# Patient Record
Sex: FEMALE | Race: OTHER | Employment: FULL TIME | URBAN - METROPOLITAN AREA
[De-identification: names, ages, dates, MRNs, and addresses within clinical notes are randomized per-mention and may not be internally consistent; named-entity substitution may affect disease eponyms.]

---

## 2022-12-13 ENCOUNTER — HOSPITAL ENCOUNTER (EMERGENCY)
Facility: HOSPITAL | Age: 27
Discharge: HOME | End: 2022-12-13
Attending: EMERGENCY MEDICINE

## 2022-12-13 VITALS
DIASTOLIC BLOOD PRESSURE: 74 MMHG | TEMPERATURE: 98 F | SYSTOLIC BLOOD PRESSURE: 118 MMHG | OXYGEN SATURATION: 96 % | HEART RATE: 108 BPM | RESPIRATION RATE: 18 BRPM

## 2022-12-13 DIAGNOSIS — T78.40XA ALLERGIC REACTION, INITIAL ENCOUNTER: Primary | ICD-10-CM

## 2022-12-13 PROCEDURE — 99283 EMERGENCY DEPT VISIT LOW MDM: CPT

## 2022-12-13 RX ORDER — PREDNISONE 20 MG/1
40 TABLET ORAL DAILY
Qty: 10 TABLET | Refills: 0 | Status: SHIPPED | OUTPATIENT
Start: 2022-12-13 | End: 2022-12-18

## 2022-12-13 RX ORDER — DIPHENHYDRAMINE HCL 25 MG
25 CAPSULE ORAL ONCE
Status: COMPLETED | OUTPATIENT
Start: 2022-12-13 | End: 2022-12-13

## 2022-12-13 NOTE — ED INITIAL ASSESSMENT (HPI)
Pt to ED for an allergic reaction for the past 5 weeks. Pt currently has hives on torso and arms, and has lip swelling. Pt denies her throat closing up, pt has a normal sized tongue. Pt has seen doctors for this, is on medication, but is unsure what the reaction is to.

## 2023-02-06 ENCOUNTER — NURSE ONLY (OUTPATIENT)
Dept: ALLERGY | Facility: CLINIC | Age: 28
End: 2023-02-06

## 2023-02-06 ENCOUNTER — OFFICE VISIT (OUTPATIENT)
Dept: ALLERGY | Facility: CLINIC | Age: 28
End: 2023-02-06

## 2023-02-06 ENCOUNTER — LAB ENCOUNTER (OUTPATIENT)
Dept: LAB | Facility: HOSPITAL | Age: 28
End: 2023-02-06
Attending: ALLERGY & IMMUNOLOGY
Payer: COMMERCIAL

## 2023-02-06 VITALS
DIASTOLIC BLOOD PRESSURE: 76 MMHG | TEMPERATURE: 99 F | HEART RATE: 92 BPM | RESPIRATION RATE: 20 BRPM | OXYGEN SATURATION: 100 % | SYSTOLIC BLOOD PRESSURE: 109 MMHG

## 2023-02-06 VITALS
WEIGHT: 189.38 LBS | HEIGHT: 63 IN | DIASTOLIC BLOOD PRESSURE: 74 MMHG | SYSTOLIC BLOOD PRESSURE: 108 MMHG | HEART RATE: 97 BPM | OXYGEN SATURATION: 99 % | BODY MASS INDEX: 33.55 KG/M2

## 2023-02-06 DIAGNOSIS — Z91.018 FOOD ALLERGY: ICD-10-CM

## 2023-02-06 DIAGNOSIS — L50.1 CHRONIC IDIOPATHIC URTICARIA: Primary | ICD-10-CM

## 2023-02-06 DIAGNOSIS — Z91.09 ENVIRONMENTAL ALLERGIES: ICD-10-CM

## 2023-02-06 DIAGNOSIS — Z3A.01 LESS THAN 8 WEEKS GESTATION OF PREGNANCY: ICD-10-CM

## 2023-02-06 DIAGNOSIS — Z92.29 COVID-19 VACCINE SERIES COMPLETED: ICD-10-CM

## 2023-02-06 DIAGNOSIS — L50.1 CHRONIC IDIOPATHIC URTICARIA: ICD-10-CM

## 2023-02-06 DIAGNOSIS — Z23 FLU VACCINE NEED: ICD-10-CM

## 2023-02-06 PROCEDURE — 86003 ALLG SPEC IGE CRUDE XTRC EA: CPT | Performed by: ALLERGY & IMMUNOLOGY

## 2023-02-06 PROCEDURE — 36415 COLL VENOUS BLD VENIPUNCTURE: CPT | Performed by: ALLERGY & IMMUNOLOGY

## 2023-02-06 PROCEDURE — 82785 ASSAY OF IGE: CPT | Performed by: ALLERGY & IMMUNOLOGY

## 2023-02-06 RX ORDER — CETIRIZINE HYDROCHLORIDE 10 MG/1
20 TABLET ORAL DAILY
COMMUNITY
Start: 2023-01-03 | End: 2023-02-06

## 2023-02-06 RX ORDER — PRENATAL VIT/IRON FUM/FOLIC AC 27MG-0.8MG
1 TABLET ORAL DAILY
COMMUNITY
Start: 2023-01-30

## 2023-02-06 RX ORDER — DIPHENHYDRAMINE HYDROCHLORIDE 12.5 MG/5ML
25 SOLUTION ORAL ONCE
Status: COMPLETED | OUTPATIENT
Start: 2023-02-06 | End: 2023-02-06

## 2023-02-06 RX ORDER — CEPHALEXIN 500 MG/1
500 CAPSULE ORAL 2 TIMES DAILY
COMMUNITY
Start: 2023-02-02

## 2023-02-06 RX ORDER — FAMOTIDINE 20 MG/1
20 TABLET, FILM COATED ORAL DAILY
COMMUNITY
Start: 2022-10-26

## 2023-02-06 RX ORDER — MELATONIN
COMMUNITY
Start: 2022-10-20

## 2023-02-06 RX ORDER — LEVOCETIRIZINE DIHYDROCHLORIDE 5 MG/1
5 TABLET, FILM COATED ORAL 2 TIMES DAILY
Qty: 180 TABLET | Refills: 1 | Status: SHIPPED | OUTPATIENT
Start: 2023-02-06

## 2023-02-06 RX ORDER — PREDNISONE 1 MG/1
30 TABLET ORAL ONCE
Status: SHIPPED | OUTPATIENT
Start: 2023-02-06

## 2023-02-06 RX ORDER — PREDNISONE 10 MG/1
TABLET ORAL
Qty: 15 TABLET | Refills: 0 | Status: SHIPPED | OUTPATIENT
Start: 2023-02-06

## 2023-02-06 RX ADMIN — PREDNISONE 30 MG: 1 TABLET ORAL at 17:01:00

## 2023-02-06 RX ADMIN — DIPHENHYDRAMINE HYDROCHLORIDE 25 MG: 12.5 SOLUTION ORAL at 18:15:00

## 2023-02-06 RX ADMIN — DIPHENHYDRAMINE HYDROCHLORIDE 25 MG: 12.5 SOLUTION ORAL at 16:28:00

## 2023-02-06 NOTE — PATIENT INSTRUCTIONS
#1 chronic idiopathic urticaria  Handouts on chronic hives provided and reviewed including majority being idiopathic in nature  Reviewed symptomatic care with antihistamines  Recommend a trial of Xyzal 5 mg once a day up to 4 times per day if needed  Consider Xolair if refractory antihistamines    #2 Food allergies  See above skin testing to screen for potential food triggers  Rec and avoiding foods that are positive on skin testing    #3 environmental allergies  See above skin testing to screen for environmental triggers. Xyzal 5 mg antihistamine. May add Flonase or Nasacort 2 sprays per nostril once a day if having prominent nasal congestion postnasal drip    #4 recommend flu vaccine. Up-to-date    #5 COVID vaccines up-to-date    #6 pregnancy. Patient reports approximate 6 weeks pregnant. No follow-ups or establishment with OB/GYN yet. Recommend to see an OB/GYN to initiate prenatal care       Orders This Visit:  No orders of the defined types were placed in this encounter. Meds This Visit:  Requested Prescriptions     Signed Prescriptions Disp Refills    levocetirizine 5 MG Oral Tab 180 tablet 1     Sig: Take 1 tablet (5 mg total) by mouth in the morning and 1 tablet (5 mg total) before bedtime.

## 2023-02-09 LAB

## 2023-02-14 ENCOUNTER — TELEPHONE (OUTPATIENT)
Dept: ALLERGY | Facility: CLINIC | Age: 28
End: 2023-02-14

## 2023-02-14 NOTE — TELEPHONE ENCOUNTER
----- Message from Charlotte Holly MD sent at 2/9/2023  2:33 PM CST -----  Please contact patient with negative serum IgE panel to common environmental allergens.   In addition her total IgE level was normal at 5.96

## 2024-03-22 ENCOUNTER — HOSPITAL ENCOUNTER (EMERGENCY)
Facility: HOSPITAL | Age: 29
Discharge: HOME | End: 2024-03-22
Attending: EMERGENCY MEDICINE
Payer: COMMERCIAL

## 2024-03-22 ENCOUNTER — APPOINTMENT (OUTPATIENT)
Dept: ULTRASOUND IMAGING | Facility: HOSPITAL | Age: 29
End: 2024-03-22
Attending: EMERGENCY MEDICINE
Payer: COMMERCIAL

## 2024-03-22 VITALS
WEIGHT: 203 LBS | DIASTOLIC BLOOD PRESSURE: 68 MMHG | RESPIRATION RATE: 18 BRPM | BODY MASS INDEX: 36 KG/M2 | OXYGEN SATURATION: 97 % | HEART RATE: 92 BPM | SYSTOLIC BLOOD PRESSURE: 103 MMHG | TEMPERATURE: 98 F

## 2024-03-22 DIAGNOSIS — M79.604 PAIN OF RIGHT LOWER EXTREMITY: Primary | ICD-10-CM

## 2024-03-22 DIAGNOSIS — I82.401 ACUTE DEEP VEIN THROMBOSIS (DVT) OF RIGHT LOWER EXTREMITY, UNSPECIFIED VEIN (HCC): ICD-10-CM

## 2024-03-22 PROCEDURE — 96372 THER/PROPH/DIAG INJ SC/IM: CPT

## 2024-03-22 PROCEDURE — 93971 EXTREMITY STUDY: CPT | Performed by: EMERGENCY MEDICINE

## 2024-03-22 PROCEDURE — 99285 EMERGENCY DEPT VISIT HI MDM: CPT

## 2024-03-22 PROCEDURE — 99284 EMERGENCY DEPT VISIT MOD MDM: CPT

## 2024-03-22 RX ORDER — ENOXAPARIN SODIUM 100 MG/ML
1 INJECTION SUBCUTANEOUS ONCE
Status: COMPLETED | OUTPATIENT
Start: 2024-03-22 | End: 2024-03-22

## 2024-03-22 RX ORDER — ACETAMINOPHEN 500 MG
1000 TABLET ORAL ONCE
Status: COMPLETED | OUTPATIENT
Start: 2024-03-22 | End: 2024-03-22

## 2024-03-22 RX ORDER — ENOXAPARIN SODIUM 100 MG/ML
1 INJECTION SUBCUTANEOUS EVERY 12 HOURS
Qty: 55.2 ML | Refills: 0 | Status: SHIPPED | OUTPATIENT
Start: 2024-03-22 | End: 2024-04-21

## 2024-03-22 NOTE — ED INITIAL ASSESSMENT (HPI)
Patient aox3 to ed via private vehicle.  Patient co of right leg pain from knee down. Reports burning sensation. Sx x last week. Denies injury.  Patient approx 10 weeks pregnant   lmp 2024

## 2024-03-22 NOTE — ED PROVIDER NOTES
Patient Seen in: [facility] Emergency Department    History     Chief Complaint   Patient presents with    Leg Pain    Pregnancy       HPI    History is provided by patient/independent historian: patient  28 year old female at 10wga here with R leg burning sensation for the past week. No known trauma. She feels weaker in that leg 2/2 the pain. No skin color changes/swelling. The pain was so bad she couldn't wait until her PCP appointment later today. She has not yet taken anything for the pain. She has had low back pain throughout pregnancy, no incontinence, fevers, hx of IVDU, cancer. No dysuria.    History reviewed. History reviewed. No pertinent past medical history.      History reviewed.   Past Surgical History:   Procedure Laterality Date    TONSILLECTOMY           Home Medications reviewed :  (Not in a hospital admission)        History reviewed.   Social History     Socioeconomic History    Marital status:    Tobacco Use    Smoking status: Never    Smokeless tobacco: Never   Substance and Sexual Activity    Alcohol use: Yes     Comment: occ    Drug use: Not Currently         ROS  Review of Systems   Respiratory:  Negative for shortness of breath.    Cardiovascular:  Negative for chest pain.   Musculoskeletal:  Positive for myalgias.   All other systems reviewed and are negative.     All other pertinent organ systems are reviewed and are negative.      Physical Exam     ED Triage Vitals [03/22/24 0722]   /75   Pulse 104   Resp 20   Temp 97.9 °F (36.6 °C)   Temp src    SpO2 95 %   O2 Device None (Room air)     Vital signs reviewed.      Physical Exam  Vitals and nursing note reviewed.   Cardiovascular:      Pulses: Normal pulses.   Pulmonary:      Effort: No respiratory distress.   Abdominal:      General: There is no distension.   Musculoskeletal:      Comments: RLE no swelling, no tenderness to palpation, able to wiggle toes, 2+ DP pulse, brisk cap refill, no spinal tenderness    Neurological:      Mental Status: She is alert.      Comments: RLE strength limited 2/2 pain, normal sensation to light touch, no foot drop         ED Course       Labs:   Labs Reviewed - No data to display      My EKG Interpretation:   As reviewed and Interpreted by me      Imaging Results Available and Reviewed while in ED:   US VENOUS DOPPLER LEG RIGHT - DIAG IMG (CPT=93971)    Result Date: 3/22/2024  CONCLUSION:  Occlusive thrombus involving femoral, popliteal, small saphenous, posterior tibial, and peroneal veins.    Dictated by (CST): Matthew Martinez MD on 3/22/2024 at 9:10 AM     Finalized by (CST): Matthew Martinez MD on 3/22/2024 at 9:13 AM         My review and independent interpretation of US images: DVT. Radiology report corroborates this in addition to other details as reported by them.      Decision rules/scores evaluated: none      Diagnostic labs/tests considered but not ordered: CBC, BMP, UA, lumbar XR    ED Medications Administered:   Medications   enoxaparin (Lovenox) 100 MG/ML SUBQ injection 90 mg (has no administration in time range)   acetaminophen (Tylenol Extra Strength) tab 1,000 mg (1,000 mg Oral Given 3/22/24 0752)            - pt without chest pain/SOB - hemodynamically stable - discussed with her OB at [facility] - comfortable with outpt management and close followup  - return precautions discussed    MDM       Medical Decision Making      Differential Diagnosis: After obtaining the patient's history, performing the physical exam and reviewing the diagnostics, multiple initial diagnoses were considered based on the presenting problem including DVT, electrolyte abnormality, neuropathy, radiculopathy, cellulitis    External document review: I personally reviewed available external medical records for any recent pertinent discharge summaries, testing, and procedures - the findings are as follows: 4/5/23 visit with lung [facility] for chronic idiopathic urticaria    Complicating Factors: The  patient already  has no past medical history on file. to contribute to the complexity of this ED evaluation.    Procedures performed: none    Discussed management with physician/appropriate source: Dr. Benitez - recommending lovenox 1mg/kg Q12 hours and f/u in office next week    Considered admission/deescalation of care for: none    Social determinants of health affecting patient care: none    Prescription medications considered: discussed continuing current medication regimen    The patient requires continuous monitoring for: leg pain    Shared decision making: discussed possible admission    Critical Care Time:  Total critical care time for this patient was 30 minutes exclusive of separately billable procedures, but in obtaining history, performing a physical exam, bedside monitoring of interventions, collecting and interpreting test, and discussion with consultants.        Disposition and Plan     Clinical Impression:  1. Pain of right lower extremity    2. Acute deep vein thrombosis (DVT) of right lower extremity, unspecified vein (HCC)        Disposition:  Discharge    Follow-up:  your doctor    Follow up        Medications Prescribed:  Current Discharge Medication List        START taking these medications    Details   enoxaparin 100 MG/ML Injection Solution Prefilled Syringe Inject 0.92 mL (92 mg total) into the skin Q12H.  Qty: 55.2 mL, Refills: 0

## 2024-03-22 NOTE — ED QUICK NOTES
Pt ambulatory to ED tx room 36 from triage  Pt A&O x 4, answering all ?s appropriately.   Pt connected to cont pulse ox & BP.   Pt here w/ c/o: RLE pain and swelling.  Pt reporting symptoms began approx 1 week ago, denies injury or trauma.  Pt reporting that last night pain became significantly worse.  Pt denies any chest pain or MESERET.  Pt is also approx 10 weeks pregnant, , LMP 2024.  Pt denies any pregnancy related complaints at this time.   Cart in low/locked position, side rails up x 2, call light w/ in reach.

## 2024-06-08 ENCOUNTER — HOSPITAL ENCOUNTER (OUTPATIENT)
Facility: HOSPITAL | Age: 29
Discharge: HOME | End: 2024-06-08
Attending: OBSTETRICS & GYNECOLOGY | Admitting: OBSTETRICS & GYNECOLOGY
Payer: COMMERCIAL

## 2024-06-08 ENCOUNTER — APPOINTMENT (OUTPATIENT)
Dept: ULTRASOUND IMAGING | Facility: HOSPITAL | Age: 29
End: 2024-06-08
Attending: OBSTETRICS & GYNECOLOGY
Payer: COMMERCIAL

## 2024-06-08 VITALS
TEMPERATURE: 98 F | BODY MASS INDEX: 36.99 KG/M2 | HEART RATE: 105 BPM | DIASTOLIC BLOOD PRESSURE: 73 MMHG | WEIGHT: 201 LBS | RESPIRATION RATE: 18 BRPM | HEIGHT: 62 IN | SYSTOLIC BLOOD PRESSURE: 112 MMHG

## 2024-06-08 LAB
ANTIBODY SCREEN: NEGATIVE
BASOPHILS # BLD AUTO: 0.03 X10(3) UL (ref 0–0.2)
BASOPHILS NFR BLD AUTO: 0.3 %
BILIRUB UR QL: NEGATIVE
CLARITY UR: CLEAR
DEPRECATED RDW RBC AUTO: 45 FL (ref 35.1–46.3)
EOSINOPHIL # BLD AUTO: 0.12 X10(3) UL (ref 0–0.7)
EOSINOPHIL NFR BLD AUTO: 1.1 %
ERYTHROCYTE [DISTWIDTH] IN BLOOD BY AUTOMATED COUNT: 13.7 % (ref 11–15)
GLUCOSE UR-MCNC: NORMAL MG/DL
HCT VFR BLD AUTO: 38.3 %
HGB BLD-MCNC: 13 G/DL
IMM GRANULOCYTES # BLD AUTO: 0.15 X10(3) UL (ref 0–1)
IMM GRANULOCYTES NFR BLD: 1.4 %
KETONES UR-MCNC: 10 MG/DL
LEUKOCYTE ESTERASE UR QL STRIP.AUTO: 500
LYMPHOCYTES # BLD AUTO: 2.69 X10(3) UL (ref 1–4)
LYMPHOCYTES NFR BLD AUTO: 25.7 %
MCH RBC QN AUTO: 30.6 PG (ref 26–34)
MCHC RBC AUTO-ENTMCNC: 33.9 G/DL (ref 31–37)
MCV RBC AUTO: 90.1 FL
MONOCYTES # BLD AUTO: 0.71 X10(3) UL (ref 0.1–1)
MONOCYTES NFR BLD AUTO: 6.8 %
NEUTROPHILS # BLD AUTO: 6.75 X10 (3) UL (ref 1.5–7.7)
NEUTROPHILS # BLD AUTO: 6.75 X10(3) UL (ref 1.5–7.7)
NEUTROPHILS NFR BLD AUTO: 64.7 %
NITRITE UR QL STRIP.AUTO: NEGATIVE
PH UR: 7 [PH] (ref 5–8)
PLATELET # BLD AUTO: 253 10(3)UL (ref 150–450)
RBC # BLD AUTO: 4.25 X10(6)UL
RH BLOOD TYPE: POSITIVE
RH BLOOD TYPE: POSITIVE
SP GR UR STRIP: 1.01 (ref 1–1.03)
UROBILINOGEN UR STRIP-ACNC: NORMAL
WBC # BLD AUTO: 10.5 X10(3) UL (ref 4–11)

## 2024-06-08 PROCEDURE — 99203 OFFICE O/P NEW LOW 30 MIN: CPT | Performed by: OBSTETRICS & GYNECOLOGY

## 2024-06-08 PROCEDURE — 76815 OB US LIMITED FETUS(S): CPT | Performed by: OBSTETRICS & GYNECOLOGY

## 2024-06-08 RX ORDER — ASPIRIN 81 MG/1
81 TABLET, CHEWABLE ORAL DAILY
COMMUNITY

## 2024-06-08 RX ORDER — ENOXAPARIN SODIUM 150 MG/ML
90 INJECTION SUBCUTANEOUS 2 TIMES DAILY
COMMUNITY

## 2024-06-09 LAB
BV BACTERIA DNA VAG QL NAA+PROBE: POSITIVE
C GLABRATA DNA VAG QL NAA+PROBE: NEGATIVE
C KRUSEI DNA VAG QL NAA+PROBE: NEGATIVE
CANDIDA DNA VAG QL NAA+PROBE: POSITIVE
T VAGINALIS DNA VAG QL NAA+PROBE: NEGATIVE

## 2024-06-09 NOTE — TRIAGE
[facility]  part of [facility]      Triage Note    Danny Kennedy Patient Status:  Outpatient    1995 MRN [id number]   Location [facility] Attending Diana Richard MD   Hosp Day # 0 PCP None Pcp      Para:   Estimated Date of Delivery: 10/22/24  Gestation: 20w4d    Chief Complaint    Vaginal Bleeding         Allergies:    Allergies   Allergen Reactions    Bactrim [Sulfamethoxazole W/Trimethoprim] HIVES       Orders Placed This Encounter   Procedures    CBC With Differential With Platelet    Urinalysis with Culture Reflex    Type and screen    ABORH (Blood Type)    Antibody Screen    ABORH Confirmation    Vaginitis Vaginosis PCR Panel    Urine Culture, Routine       Lab Results   Component Value Date    WBC 10.5 2024    HGB 13.0 2024    HCT 38.3 2024    .0 2024       Clinitek UA  Lab Results   Component Value Date    GLUUR Normal 2024    SPECGRAVITY 1.011 2024       UA  Lab Results   Component Value Date    COLORUR Light-Yellow 2024    CLARITY Clear 2024    SPECGRAVITY 1.011 2024    PROUR Trace (A) 2024    GLUUR Normal 2024    KETUR 10 (A) 2024    BILUR Negative 2024    BLOODURINE 3+ (A) 2024    NITRITE Negative 2024    UROBILINOGEN Normal 2024    LEUUR 500 (A) 2024       Vitals:    24 2045 24 2049 24 2230   BP: 128/67  112/73   BP Location: Left arm  Left arm   Pulse: (!) 126  105   Resp: 18  18   Temp: 98.4 °F (36.9 °C)  98.4 °F (36.9 °C)   TempSrc: Oral  Oral   Weight:  91.2 kg (201 lb)    Height:  157.5 cm (5' 2\")        NST                                                                                                                    Nonstress Test Interpretation: Appropriate for gestational age                                    Additional Comments Comments: pt is a  20w4d presents to triage for vaginal  bleeding. pt reports bleeding since 6/7/2024 but also previous bleeding in pregnancy pt gets care at [facility].  doppler preformed and fht confirmed Bautista called and at bedside preformed vaginal speculum exam and cleared any blood with cotton tips labs were drawn and collected pt sent for ultrasound in stable conditions no evidence of abruption noted per md pt can be discharged home to follow up with providers this coming week all question answered pt given verbal and written education     Chief Complaint   Patient presents with    Vaginal Bleeding     Pt reports vaginal spotting since [id number] pt reports bleeding stopped and then today [id number] restarted pt has not saturated pads with scant bleeding from pictures           Brit VILLA RN  6/8/2024 10:46 PM

## 2024-06-09 NOTE — PROGRESS NOTES
Pt is a 28 year old female admitted to TR1/TR1-A.     Chief Complaint   Patient presents with    Vaginal Bleeding     Pt reports vaginal spotting since  pt reports bleeding stopped and then today  restarted       Pt is  20w4d intra-uterine pregnancy.  History obtained, consents signed. Oriented to room, staff, and plan of care.

## 2024-06-09 NOTE — H&P
[facility]  part of [facility]    OB Triage H & P    Danny Kennedy Patient Status:  Outpatient    1995 MRN [id number]   Location [facility] Attending Diana Richard MD   Hosp Day # 0 PCP None Pcp       Date of Admission:  2024    SUBJECTIVE:    Reason for Triage Evaluation:  Vaginal bleeding    History of Present Illness:  Patient is a 28 year old    Patient's last menstrual period was 2024.  IUP 20  presents to [facility] Labor and Delivery triage with complaints of vaginal bleeding yesterday and today. Initial vaginal bleeding occurred while vacationing in [place]. In [place] she was treated with Indocin suppositories for 2 days and vaginal bleeding/spotting stopped. Prenatal care at [facility]. OB ultrasound done at [facility] on 2024 revealed anterior placenta. Patient diagnosed with right lower extremity DVT on 3- and uses Lovenox BID and takes baby aspirin daily.  No other complaints.      Medications:  Medications Prior to Admission   Medication Sig Dispense Refill Last Dose    aspirin 81 MG Oral Chew Tab Chew 1 tablet (81 mg total) by mouth daily.       enoxaparin 150 MG/ML Injection Solution Prefilled Syringe Inject 90 mL (13,500 mg total) into the skin in the morning and 90 mL (13,500 mg total) before bedtime.       Prenatal 27-0.8 MG Oral Tab Take 1 tablet by mouth daily.   2024    [] enoxaparin 100 MG/ML Injection Solution Prefilled Syringe Inject 0.92 mL (92 mg total) into the skin Q12H. 55.2 mL 0     famotidine 20 MG Oral Tab Take 20 mg by mouth daily.       cholecalciferol 25 MCG (1000 UT) Oral Tab TAKE 1 TABLET BY MOUTH ONCE A DAY TOME 1 TABLETA POR LA BOCA CADA MARY.       cephalexin 500 MG Oral Cap Take 500 mg by mouth 2 (two) times daily.       levocetirizine 5 MG Oral Tab Take 1 tablet (5 mg total) by mouth in the morning and 1 tablet (5 mg total) before bedtime. 180 tablet 1     predniSONE  10 MG Oral Tab Take 3 tabs(30 mg) with food once a day x 5 days 15 tablet 0        Allergies:  Allergies   Allergen Reactions    Bactrim [Sulfamethoxazole W/Trimethoprim] HIVES        Past Medical History:  Past Medical History:    Thrombosis    r leg       Past Surgical History:  Past Surgical History:   Procedure Laterality Date    Tonsillectomy         Past OB History:  OB History    Para Term  AB Living   2             SAB IAB Ectopic Multiple Live Births                  # Outcome Date GA Lbr Larry/2nd Weight Sex Type Anes PTL Lv   2 Current            1                 Past GYN History:   Menses regular    Social History:  Social History     Tobacco Use    Smoking status: Never    Smokeless tobacco: Never   Substance Use Topics    Alcohol use: Yes     Comment: occ        Review of Systems:  As above    OBJECTIVE:  Temp:  [98.4 °F (36.9 °C)] 98.4 °F (36.9 °C)  Pulse:  [126] 126  Resp:  [18] 18  BP: (128)/(67) 128/67  No intake or output data in the 24 hours ending 245    General:   alert, appears stated age, and cooperative   Skin:   normal   Lungs:   clear to auscultation bilaterally   Heart:   S1, S2 normal, regular rate and rhythm,     Abdomen:  Obese, Gravid, NT   Extremities:  Ext genitalia:  SSE   FHTs: NTl    normal general appearance  Cervix:  closed , minimal blood in vagina  140's                                                         ASSESSMENT/PLAN:  28  IUP 20 4/7 weeks with vaginal bleeding- Lmited OB ultrasound ordered. Check CBC, urine analysis and type and screen    All of the findings and plan were discussed with the patient.  She notes understanding and agrees with the plan of care.  All questions were answered to the best of my ability at this time.    BROOKS BRYAN MD   2024  9:35 PM